# Patient Record
Sex: FEMALE | URBAN - METROPOLITAN AREA
[De-identification: names, ages, dates, MRNs, and addresses within clinical notes are randomized per-mention and may not be internally consistent; named-entity substitution may affect disease eponyms.]

---

## 2019-11-06 ENCOUNTER — IMPORTED ENCOUNTER (OUTPATIENT)
Dept: URBAN - METROPOLITAN AREA CLINIC 9 | Facility: CLINIC | Age: 64
End: 2019-11-06

## 2021-04-19 NOTE — PATIENT DISCUSSION
Discussed the nature of keratoconus, the risks of further progression, and all viable treatment options.

## 2021-04-19 NOTE — PATIENT DISCUSSION
Pt is not a candidate for ADV or CV due to Keratoconus, irreg. astig. Pt may have consult with [facility] for crosslinking and possible epi lasek down the road.

## 2021-10-16 ASSESSMENT — VISUAL ACUITY
OS_CC: 20/25 - SN
OS_SC: 20/40 SN
OD_CC: 20/25 - SN
OD_SC: 20/50 SN
OS_CC: 20/30 SN

## 2021-10-16 ASSESSMENT — TONOMETRY
OS_IOP_MMHG: 18
OD_IOP_MMHG: 17

## 2021-10-16 ASSESSMENT — PACHYMETRY
OS_CT_UM: 597.0
OD_CT_UM: 586.0

## 2022-07-01 RX ORDER — INSULIN GLARGINE 300 U/ML
INJECTION, SOLUTION SUBCUTANEOUS
COMMUNITY

## 2022-07-01 RX ORDER — AMLODIPINE BESYLATE 10 MG/1
TABLET ORAL
COMMUNITY

## 2022-07-01 RX ORDER — INSULIN GLARGINE 100 [IU]/ML
INJECTION, SOLUTION SUBCUTANEOUS
COMMUNITY
End: 2022-08-17

## 2022-07-01 RX ORDER — ESOMEPRAZOLE MAGNESIUM 40 MG/1
1 CAPSULE, DELAYED RELEASE ORAL
COMMUNITY
End: 2022-10-28 | Stop reason: SDUPTHER

## 2022-07-01 RX ORDER — ROSUVASTATIN CALCIUM 10 MG/1
TABLET, COATED ORAL
COMMUNITY

## 2022-07-01 RX ORDER — LISINOPRIL AND HYDROCHLOROTHIAZIDE 12.5; 1 MG/1; MG/1
TABLET ORAL
COMMUNITY
End: 2022-08-17

## 2022-07-01 RX ORDER — ERGOCALCIFEROL (VITAMIN D2) 1250 MCG
1 CAPSULE ORAL
COMMUNITY
End: 2022-08-17

## 2022-08-16 PROBLEM — E11.9 DIABETES MELLITUS (HCC): Status: ACTIVE | Noted: 2022-08-16

## 2022-08-16 PROBLEM — E55.9 VITAMIN D DEFICIENCY: Status: ACTIVE | Noted: 2022-08-16

## 2022-08-16 PROBLEM — I10 ESSENTIAL HYPERTENSION: Status: ACTIVE | Noted: 2022-08-16

## 2022-08-16 PROBLEM — E78.00 PURE HYPERCHOLESTEROLEMIA: Status: ACTIVE | Noted: 2022-08-16

## 2022-08-16 PROBLEM — E11.9 TYPE 2 DIABETES MELLITUS WITHOUT COMPLICATION (HCC): Status: ACTIVE | Noted: 2022-08-16

## 2022-08-17 PROBLEM — E78.89 ELEVATED HDL: Status: ACTIVE | Noted: 2022-08-17

## 2022-08-17 PROBLEM — E78.5 HYPERLIPIDEMIA: Status: ACTIVE | Noted: 2022-08-17

## 2022-08-17 PROBLEM — M54.50 LOW BACK PAIN: Status: ACTIVE | Noted: 2022-08-17

## 2022-08-17 PROBLEM — K21.9 GERD (GASTROESOPHAGEAL REFLUX DISEASE): Status: ACTIVE | Noted: 2022-08-17

## 2022-11-02 PROBLEM — E78.6 HIGH DENSITY LIPOPROTEIN (HDL) LESS THAN 40 MG/DL: Status: ACTIVE | Noted: 2022-11-02
